# Patient Record
Sex: FEMALE | Race: WHITE
[De-identification: names, ages, dates, MRNs, and addresses within clinical notes are randomized per-mention and may not be internally consistent; named-entity substitution may affect disease eponyms.]

---
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
## 2022-04-07 ENCOUNTER — HOSPITAL ENCOUNTER (EMERGENCY)
Dept: HOSPITAL 97 - ER | Age: 22
LOS: 1 days | Discharge: HOME | End: 2022-04-08
Payer: SELF-PAY

## 2022-04-07 DIAGNOSIS — N93.9: Primary | ICD-10-CM

## 2022-04-07 DIAGNOSIS — Z88.6: ICD-10-CM

## 2022-04-07 LAB
BUN BLD-MCNC: 12 MG/DL (ref 7–18)
GLUCOSE SERPLBLD-MCNC: 69 MG/DL (ref 74–106)
HCT VFR BLD CALC: 36.6 % (ref 36–45)
LYMPHOCYTES # SPEC AUTO: 2.9 K/UL (ref 0.7–4.9)
PMV BLD: 7.8 FL (ref 7.6–11.3)
POTASSIUM SERPL-SCNC: 3.9 MMOL/L (ref 3.5–5.1)
RBC # BLD: 4.29 M/UL (ref 3.86–4.86)
SP GR UR: >1.03 (ref 1–1.03)

## 2022-04-07 PROCEDURE — 74177 CT ABD & PELVIS W/CONTRAST: CPT

## 2022-04-07 PROCEDURE — 85025 COMPLETE CBC W/AUTO DIFF WBC: CPT

## 2022-04-07 PROCEDURE — 36415 COLL VENOUS BLD VENIPUNCTURE: CPT

## 2022-04-07 PROCEDURE — 80048 BASIC METABOLIC PNL TOTAL CA: CPT

## 2022-04-07 PROCEDURE — 81025 URINE PREGNANCY TEST: CPT

## 2022-04-07 PROCEDURE — 81003 URINALYSIS AUTO W/O SCOPE: CPT

## 2022-04-07 PROCEDURE — 99283 EMERGENCY DEPT VISIT LOW MDM: CPT

## 2022-04-07 PROCEDURE — 76830 TRANSVAGINAL US NON-OB: CPT

## 2022-04-07 NOTE — XMS REPORT
Continuity of Care Document

                            Created on:2022



Patient:EBONY ALEX

Sex:Female

:2000

External Reference #:[id number]





Demographics







                          Address                   [address] 



                                                    [place] [address]

 

                          Home Phone                [phone]

 

                          Mobile Phone              [phone]

 

                          Email Address             [email or web address]

 

                          Preferred Language        Unknown

 

                          Marital Status            Unknown

 

                          [place] Affiliation     Unknown

 

                          Race                      Unknown

 

                          Additional Race(s)        Unavailable



                                                    Unavailable

 

                          Ethnic Group              Unknown









Author







                          Organization              [facility]

t

 

                          Address                   [address]



                                                    [place] [address]

 

                          Phone                     [phone]









Support







                Name            Relationship    Address         Phone

 

                TERRIE CHARISSA  Unavailable     [address]  [phone] 



                                                [place] [address] 

 

                CATHI FALCON  Unavailable     [address]  [phone] 



                                                [place] [address] 









Care Team Providers







                    Name                Role                Phone

 

                    RADIOLOGY           Attending Clinician Unavailable

 

                    Lab, Adc Fam Pob I  Attending Clinician Unavailable

 

                    Ofelia RN          Attending Clinician Unavailable

 

                    Pcp,  Does Not Have A Attending Clinician [phone]

 

                    Anene FNP           Attending Clinician [phone]

 

                    ANENE               Attending Clinician Unavailable









Payers







           Payer Name Policy Type Policy Number Effective Date Expiration Date [facility]          [id number]  [id number]            



           [employer] Medicaid                       00:00:00              







Problems

This patient has no known problems.



Allergies, Adverse Reactions, Alerts







       Allergy Allergy Status Severity Reaction(s) Onset  Inactive Treating Comm

ents 

Source



       Name   Type                        Date   Date   Clinician        

 

       No Known DA     Active U             2015                      [facility]



       Allergie                             16                        [place]



       s                                  00:00:                      [address]

 

       NO KNOWN Drug   Active                                           [facility]



       ALLERGIE Class                                                   ity of



       S                                                              [facility]







Social History







           Social Habit Start Date Stop Date  Quantity   Comments   Source

 

           Sex Assigned At Birth                                             [facility]

versity [facility]

 

           Exposure to SARS-CoV-2                       Not sure              [facility]

[facility] [place]



           (event)                                                [facility]









                Smoking Status  Start Date      Stop Date       Source

 

                Unknown if ever smoked                                 [facility]

y [facility]







Medications

This patient has no known medications.



Procedures

This patient has no known procedures.



Encounters







        Start   End     Encounter Admission Attending Care    Care    Encounter 

Source



        Date/Time Date/Time Type    Type    Clinicians Facility Department ID   

   

 

        2022         Outpatient                 [facility]    [id number]

 [place]



        14:07:22                                                 [id number]  [facility]

 

        2021 Outpatient                 [facility]    [id number]

-20 [facility]



        11:30:00 11:30:00                                         [id number]  ity of



                                                                        [facility]

 

        2021 Outpatient R       RADIOLOGY [facility]    [id number]

[id number] [facility]



        00:00:00 00:00:00                                                 ity of



                                                                        [facility]

 

        2021 Outpatient R       RADIOLOGY [facility]    [id number]

9A-20 Univers



        00:00:00 00:00:00                                         [id number]  ity of



                                                                        [facility]

 

        2021 Outpatient R       RADIOLOGY [facility]    [id number]

[id number] [facility]



        00:00:00 00:00:00                                                 ity [facility]

 

        2020 Letter          Lab, [facility]    [id number]

[id number] Univers



        00:00:00 00:00:00 (Out)           Fam Pob I Health  [id number]         

ity of



                                                [place] 4.2.7.2.686         [place]

as



                                                Professio [phone]         Me

dical



                                                [address]



                                                Office                  



                                                Building                 



                                                One                     

 

        2020 Letter          TEETEE Gilbert    [id number]

509 Univers



        00:00:00 00:00:00 (Out)           [place]    ESTEBAN   350.1.13.10         it

y of



                                                HOSPITAL 4.2.7.2.686         [place]

as



                                                        [phone]         [address]

 

        2020 Telephone         TEETEE Mariee    [id number]

[id number] [facility]



        00:00:00 00:00:00                 Patient ESTEBAN   350.1.13.10         it

y of



                                        Does Not HOSPITAL 4.2.7.2.686         Te

[place]



                                        Have A          [phone]         [address]

 

        2020 Laboratory         Lab, [facility] I [facility]UST    1.2.

[id number] 

Univers



        08:23:20 08:43:20 Only            Anene, [facility]  350.1.13.10    

     ity of



                                                [place] 4.2.7.2.686         [place]

as



                                                Professio [phone]         Me

dical



                                                nal     [address]             [place]



                                                Office                  



                                                Building                 



                                                One                     

 

        2020 Outpatient JOAQUIN THOMAS,  [facility]    [id number]

[id number] Univers



        08:20:00 08:20:00                 JACLYN pelaez of



                                                                        [facility]







Results

This patient has no known results.

## 2022-04-08 VITALS — DIASTOLIC BLOOD PRESSURE: 61 MMHG | SYSTOLIC BLOOD PRESSURE: 137 MMHG

## 2022-04-08 VITALS — OXYGEN SATURATION: 100 %

## 2022-04-08 VITALS — TEMPERATURE: 98 F

## 2022-04-08 NOTE — EDPHYS
Physician Documentation                                                                           

 [facility]                                                                 

Name: Jeannie Forde                                                                              

Age: 21 yrs                                                                                       

Sex: Female                                                                                       

: 2000                                                                                   

MRN: [id number]                                                                                   

Arrival Date: 2022                                                                          

Time: 20:28                                                                                       

Account#: [id number]                                                                            

Bed 19                                                                                            

Private MD:                                                                                       

ED Physician Maurice Herrera                                                                         

HPI:                                                                                              

                                                                                             

20:40 This 21 yrs old Female presents to ER via Ambulatory with complaints of Abdominal Pain, cp  

      Vaginal Bleeding.                                                                           

20:40 The patient presents with vaginal bleeding that is waxing and waning. Onset: The        cp  

      symptoms/episode began/occurred 1 month(s) ago.                                             

20:40 Associated signs and symptoms: Pertinent positives: abdominal pain, Pertinent           cp  

      negatives: diarrhea, fever, vomiting. The patient's method of birth control includes        

      nothing. Patient reports history of PCOS and vaginal bleeding has been intermittent.        

                                                                                                  

Historical:                                                                                       

- Allergies:                                                                                      

20:32 Aspirin;                                                                                ab2 

- PMHx:                                                                                           

20:32 PCOS;                                                                                   ab2 

- PSHx:                                                                                           

20:32 None;                                                                                   ab2 

                                                                                                  

- Immunization history:: Adult Immunizations up to date.                                          

- Social history:: Smoking status: Reported history of juuling and/or vaping.                     

                                                                                                  

                                                                                                  

ROS:                                                                                              

20:45 Constitutional: Negative for body aches, chills, fever, poor PO intake.                 cp  

20:45 Eyes: Negative for injury, pain, redness, and discharge.                                cp  

20:45 ENT: Negative for drainage from ear(s), ear pain, sore throat, difficulty swallowing,       

      difficulty handling secretions.                                                             

20:45 Cardiovascular: Negative for chest pain, palpitations.                                      

20:45 Respiratory: Negative for cough, shortness of breath, wheezing.                             

20:45 Abdomen/GI: Positive for abdominal pain, Negative for vomiting, diarrhea, constipation.     

20:45 Back: Negative for pain at rest, pain with movement.                                        

20:45 : Positive for vaginal bleeding, Negative for flank pain.                                 

20:45 Neuro: Negative for altered mental status, weakness.                                        

20:45 All other systems are negative.                                                             

                                                                                                  

Exam:                                                                                             

20:50 Constitutional: The patient appears in no acute distress, alert, awake, non-toxic, well cp  

      developed, well nourished.                                                                  

20:50 Head/Face:  Normocephalic, atraumatic.                                                  cp  

20:50 Eyes: Periorbital structures: Conjunctiva: normal, no exudate, no injection, Sclera: no     

      appreciated abnormality, Lids and lashes: appear normal, bilaterally.                       

20:50 ENT: External ear(s): are unremarkable, Nose: is normal, Mouth: Lips: moist, Oral           

      mucosa: moist, Posterior pharynx: Airway: no evidence of obstruction, patent.               

20:50 Chest/axilla: Inspection: normal, Palpation: is normal, no crepitus, no tenderness.         

20:50 Cardiovascular: Rate: normal, Rhythm: regular.                                              

20:50 Respiratory: the patient does not display signs of respiratory distress,  Respirations:     

      normal, no use of accessory muscles, no retractions, labored breathing, is not present,     

      Breath sounds: are clear throughout, no decreased breath sounds, no stridor, no             

      wheezing.                                                                                   

20:50 Abdomen/GI: Inspection: abdomen appears normal, Bowel sounds: active, Palpation: soft,      

      in all quadrants, moderate abdominal tenderness, in the right lower quadrant, rebound       

      tenderness, is not appreciated, involuntary guarding, is not appreciated.                   

20:50 Back: ROM is normal, CVA tenderness, is absent.                                             

                                                                                                  

Vital Signs:                                                                                      

20:29  / 73; Pulse 93; Resp 17; Temp 98.0; Pulse Ox 99% on R/A; Weight 108.86 kg;       ab2 

      Height 5 ft. 0 in. (152.40 cm); Pain 6/10;                                                  

21:54  / 55; Pulse 80; Resp 19; Pulse Ox 100% on R/A;                                   ke1 

                                                                                             

00:16  / 61; Pulse 80; Resp 18; Pulse Ox 100% on R/A;                                   ke1 

                                                                                             

20:29 Body Mass Index 46.87 (108.86 kg, 152.40 cm)                                            ab2 

                                                                                                  

MDM:                                                                                              

                                                                                             

21:22 Patient medically screened.                                                             cp  

22:00 Differential diagnosis: appendicitis, malignancy, menometrorrhagia, ovarian cyst,       cp  

      pelvic inflammatory disease, urinary tract infection, vaginosis.                            

                                                                                             

00:23 Data reviewed: vital signs, nurses notes, lab test result(s), radiologic studies, CT    cp  

      scan, ultrasound.                                                                           

00:23 Counseling: I had a detailed discussion with the patient and/or guardian regarding: the cp  

      historical points, exam findings, and any diagnostic results supporting the                 

      discharge/admit diagnosis, lab results, radiology results, the need for outpatient          

      follow up, for definitive care, an OB/Gyne specialist, to return to the emergency           

      department if symptoms worsen or persist or if there are any questions or concerns that     

      arise at home. Response to treatment: the patient's symptoms have mildly improved after     

      treatment, and as a result, I will discharge patient. Special discussion: Based on the      

      patient's Hx, exam, and Dx evaluation, there is no indication for emergent surgery or       

      inpatient Tx. It is understood by the patient/guardian that if the Sx's persist or          

      worsen they need to return immediately for re-evaluation.                                   

                                                                                                  

                                                                                             

20:33 Order name: Basic Metabolic Panel; Complete Time: 22:30                                 cp  

                                                                                             

22:30 Interpretation: Normal except: GLUC 69.                                                 cp  

                                                                                             

20:33 Order name: CBC with Diff; Complete Time: 22:30                                         cp  

                                                                                             

22:30 Interpretation: Normal except: WBC 12.3; NEUT A 8.3.                                    cp  

                                                                                             

21:31 Order name: US Transvaginal Study (Probe)                                               cp  

                                                                                             

21:32 Order name: Urine Dipstick-Ancillary; Complete Time: 22:30                              EDMS

                                                                                             

22:30 Interpretation: Normal except: UBLD 2+; UESTR Trace.                                    cp  

                                                                                             

21:34 Order name: Urine Pregnancy--Ancillary (enter results); Complete Time: 22:30            oe  

                                                                                             

22:33 Order name: CT Abd/Pelvis - IV Contrast Only                                            cp  

                                                                                             

20:33 Order name: IV Saline Lock; Complete Time: 22:56                                        cp  

                                                                                             

20:33 Order name: Labs collected and sent; Complete Time: 22:56                               cp  

                                                                                             

20:33 Order name: NPO; Complete Time: 21:25                                                   cp  

                                                                                             

20:33 Order name: Urine Dipstick-Ancillary (obtain specimen); Complete Time: 21:33            cp  

                                                                                             

20:33 Order name: Urine Pregnancy Test (obtain specimen); Complete Time: 21:33                cp  

                                                                                                  

Administered Medications:                                                                         

No medications were administered                                                                  

                                                                                                  

                                                                                                  

Disposition:                                                                                      

03:16 Co-signature as Attending Physician, Maurice MATA was immediately available in the  [facility] 

      Emergency Department for consultation in the care of the patient..                          

                                                                                                  

Disposition Summary:                                                                              

22 00:23                                                                                    

Discharge Ordered                                                                                 

      Location: Home                                                                          cp  

      Problem: new                                                                            cp  

      Symptoms: have improved                                                                 cp  

      Condition: Stable                                                                       cp  

      Diagnosis                                                                                   

        - Lower abdominal pain, unspecified                                                   cp  

        - Abnormal uterine and vaginal bleeding, unspecified                                  cp  

      Followup:                                                                               cp  

        - With: Marnie Celis MD                                                              

        - When: 1 week                                                                             

        - Reason: Recheck today's complaints                                                       

      Discharge Instructions:                                                                     

        - Discharge Summary Sheet                                                             cp  

        - Abdominal Pain, Adult                                                               cp  

        - Abnormal Uterine Bleeding                                                           cp  

        - Form - Excuse from Work, School, or Physical Activity                               cp  

      Forms:                                                                                      

        - Medication Reconciliation Form                                                      cp  

        - Thank You Letter                                                                    cp  

        - Antibiotic Education                                                                cp  

        - Prescription Opioid Use                                                             cp  

      Prescriptions:                                                                              

        - dicyclomine 20 mg Oral Tablet                                                            

            - take 1 tablet by ORAL route 4 times per day; 30 tablet; Refills: 0, Product     cp  

      Selection Permitted                                                                         

        - Bactrim -160 mg Oral Tablet                                                        

            - take 1 tablet by ORAL route every 12 hours for 7 days; 14 tablet; Refills: 0,   cp  

      Product Selection Permitted                                                                 

Signatures:                                                                                       

Dispatcher [employer]                           EDMS                                                 

Noble Cash PA PA cp Sims, Marcus, DO                        DO   ms3                                                  

Timbo Amor                           ab2                                                  

                                                                                                  

**************************************************************************************************

## 2022-04-08 NOTE — ER
Nurse's Notes                                                                                     

 [facility]                                                                 

Name: Jeannie Forde                                                                              

Age: 21 yrs                                                                                       

Sex: Female                                                                                       

: 2000                                                                                   

MRN: [id number]                                                                                   

Arrival Date: 2022                                                                          

Time: 20:28                                                                                       

Account#: [id number]                                                                            

Bed 19                                                                                            

Private MD:                                                                                       

Diagnosis: Lower abdominal pain, unspecified;Abnormal uterine and vaginal bleeding, unspecified   

                                                                                                  

Presentation:                                                                                     

                                                                                             

20:29 Chief complaint: Patient states: "I have PCOS and my cysts are hurting me really bad.   ab2 

      I've also been bleeding for a month." Pt denies n/v/d. Pt c/o lower abdominal pain and      

      vaginal bleeding. Coronavirus screen: Vaccine status: Patient reports receiving the 2nd     

      dose of the covid vaccine. Client denies travel out of the [place] in the last 14 days. At     

      this time, the client does not indicate any symptoms associated with coronavirus-19.        

      Ebola Screen: Patient negative for fever greater than or equal to 101.5 degrees             

      Fahrenheit, and additional compatible Ebola Virus Disease symptoms Patient denies           

      exposure to infectious person. Patient denies travel to an Ebola-affected area in the       

      21 days before illness onset. No symptoms or risks identified at this time. Initial         

      Sepsis Screen: Does the patient meet any 2 criteria? No. Patient's initial sepsis           

      screen is negative. Does the patient have a suspected source of infection? No.              

      Patient's initial sepsis screen is negative. Risk Assessment: Do you want to hurt           

      yourself or someone else? Patient reports no desire to harm self or others. Onset of        

      symptoms is unknown.                                                                        

20:29 Method Of Arrival: Ambulatory                                                           ab2 

20:29 Acuity: JOSE A 3                                                                           ab2 

                                                                                                  

Triage Assessment:                                                                                

20:32 General: Appears in no apparent distress. uncomfortable, Behavior is calm, cooperative, ab2 

      appropriate for age. Pain: Complains of pain in suprapubic area. Neuro: Level of            

      Consciousness is awake, alert, obeys commands, Oriented to person, place, time,             

      situation, Appropriate for age  are equal bilaterally Moves all extremities. Gait      

      is steady. Respiratory: Airway is patent Respiratory effort is even, unlabored,             

      Respiratory pattern is regular, symmetrical. GI: Reports lower abdominal pain. :          

      Reports vaginal bleeding that is since one month.                                           

                                                                                                  

Historical:                                                                                       

- Allergies:                                                                                      

20:32 Aspirin;                                                                                ab2 

- PMHx:                                                                                           

20:32 PCOS;                                                                                   ab2 

- PSHx:                                                                                           

20:32 None;                                                                                   ab2 

                                                                                                  

- Immunization history:: Adult Immunizations up to date.                                          

- Social history:: Smoking status: Reported history of juuling and/or vaping.                     

                                                                                                  

                                                                                                  

Screenin:53 Abuse screen: Denies threats or abuse. Nutritional screening: No deficits noted.        ke1 

      Tuberculosis screening: No symptoms or risk factors identified. Fall Risk None              

      identified.                                                                                 

                                                                                                  

Assessment:                                                                                       

21:53 GI: Bowel sounds present X 4 quads. Abd is soft Abdomen is tender to palpation in right ke1 

      lower quadrant and left lower quadrant.                                                     

                                                                                                  

Vital Signs:                                                                                      

20:29  / 73; Pulse 93; Resp 17; Temp 98.0; Pulse Ox 99% on R/A; Weight 108.86 kg;       ab2 

      Height 5 ft. 0 in. (152.40 cm); Pain 6/10;                                                  

21:54  / 55; Pulse 80; Resp 19; Pulse Ox 100% on R/A;                                   ke1 

                                                                                             

00:16  / 61; Pulse 80; Resp 18; Pulse Ox 100% on R/A;                                   ke1 

                                                                                             

20:29 Body Mass Index 46.87 (108.86 kg, 152.40 cm)                                            ab2 

                                                                                                  

ED Course:                                                                                        

                                                                                             

20:28 Patient arrived in ED.                                                                  ja2 

20:32 Triage completed.                                                                       ab2 

20:32 Noble Cash PA is PHCP.                                                                cp  

20:32 Maurice Herrera DO is Attending Physician.                                                cp  

20:33 Arm band placed on right wrist.                                                         ab2 

21:08 Lori Trujillo, RN is Primary Nurse.                                                 ke1 

21:52 No provider procedures requiring assistance completed. Inserted saline lock: 22 gauge   ke1 

      in left antecubital area, using aseptic technique.                                          

21:53 Bed in low position. Call light in reach.                                               ke1 

22:28 US Transvaginal Study (Probe) In Process Unspecified.                                   EDMS

23:02 CT Abd/Pelvis - IV Contrast Only In Process Unspecified.                                EDMS

                                                                                             

00:22 Marnie Ceils MD is Referral Physician.                                            cp  

00:24 IV discontinued.                                                                        ke1 

                                                                                                  

Administered Medications:                                                                         

No medications were administered                                                                  

                                                                                                  

                                                                                                  

Outcome:                                                                                          

00:23 Discharge ordered by MD.                                                                cp  

00:24 Discharged to home ambulatory.                                                          ke1 

00:24 Condition: good                                                                             

00:24 Discharge instructions given to patient.                                                    

00:42 Patient left the ED.                                                                    ke1 

                                                                                                  

Signatures:                                                                                       

Dispatcher [employer]                           EDMS                                                 

Noble Cash PA PA cp Alexander, Jessica ja2 Bleininger, Alexis ab2 Ebrottie, Kouassi, RN                   RN   ke1                                                  

                                                                                                  

**************************************************************************************************

## 2022-04-11 NOTE — RAD REPORT
EXAM DESCRIPTION:   Transvaginal Study Probe

[facility]: US PELVIS TRANSVAGINAL

 

CLINICAL HISTORY:  Abd pain;Vaginal bleeding.

 

COMPARISON:  None.

 

TECHNIQUE:  Real-time grayscale and color Doppler images of the pelvis were obtained utilizing transv
aginal technique.

 

FINDINGS:  Uterus: Retroverted retroflexed configuration of the uterus, which measures 4.1 x 4.4 x 7.
5 cm.   Endometrium measures 0.5 cm in thickness. There are a few small endocervical cysts.

Right ovary: 2.5 x 2.2 x 2.1 cm (volume 5.8 mL).   Normal arterial flow. No concerning lesions.

Left ovary: 2.2 x 2.6 x 2.3 cm (volume   6.8    mL).   Normal arterial flow. No concerning lesions.

Cul-de-sac: Trace free fluid identified.

 

IMPRESSION:  Normal pelvic ultrasound.

 

Electronically signed by:   Lindsey Rodriguez MD   4/7/2022 10:47 PM CDT Workstation: [id number]

 

 

Due to temporary technical issues with the PACS/Fluency reporting system, reports are being signed by
 the in house radiologist without review as a courtesy to ensure prompt reporting. The interpreting r
adiologist is fully responsible for the content of the report.

## 2022-04-11 NOTE — RAD REPORT
EXAM DESCRIPTION:  CT Abdomen and Pelvis With Intravenous Contrast

 

CLINICAL HISTORY:  The patient is 21 years old and is Female; RLQ abdomen pain

 

TECHNIQUE:  Axial computed tomography images of the abdomen and pelvis with intravenous contrast.   S
agittal and coronal reformatted images were created and reviewed.   This CT exam was performed using 
one or more of the following dose reduction techniques:   automated exposure control, adjustment of t
he mA and/or kV according to patient size, and/or use of iterative reconstruction technique.

 

COMPARISON:  None.

 

FINDINGS:  LUNG BASES:   Unremarkable.

ABDOMEN:

   LIVER:   Unremarkable.   No mass.

   GALLBLADDER AND BILE DUCTS:   Unremarkable.   No calcified stones.

   PANCREAS:   Unremarkable.   No mass.

   SPLEEN:   Unremarkable.

   ADRENALS:   Unremarkable.

   KIDNEYS AND URETERS:   Unremarkable.   No solid mass.   No hydronephrosis.

   STOMACH AND BOWEL:   Unremarkable.   No obstruction.

PELVIS:

   APPENDIX:   No findings to suggest acute appendicitis.

   BLADDER:   Mild diffuse urinary bladder wall thickening.

   REPRODUCTIVE:   Mild stranding around the right ovary.

ABDOMEN and PELVIS:

   INTRAPERITONEAL SPACE:   Small free fluid in the pelvis.   No free air.

   BONES/JOINTS:   No acute fracture.   No dislocation.

   SOFT TISSUES:   Unremarkable.

   VASCULATURE:   Unremarkable.   No abdominal aortic aneurysm.

   LYMPH NODES:   Unremarkable.   No enlarged lymph nodes.

 

IMPRESSION:  1.   Mild diffuse urinary bladder wall thickening which is nonspecific but may represent
 cystitis in the appropriate clinical setting.

2.   Mild stranding around the right ovary. Recommend pelvic sonogram for further evaluation.

 

Electronically signed by:   Rickey Jones MD   4/7/2022 11:26 PM CDT Workstation: [id number]

 

 

Due to temporary technical issues with the PACS/Fluency reporting system, reports are being signed by
 the in house radiologist without review as a courtesy to ensure prompt reporting. The interpreting r
adiologist is fully responsible for the content of the report.